# Patient Record
Sex: FEMALE | Race: WHITE | NOT HISPANIC OR LATINO | Employment: FULL TIME | URBAN - NONMETROPOLITAN AREA
[De-identification: names, ages, dates, MRNs, and addresses within clinical notes are randomized per-mention and may not be internally consistent; named-entity substitution may affect disease eponyms.]

---
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
## 2024-01-22 ENCOUNTER — OFFICE VISIT (OUTPATIENT)
Dept: URGENT CARE | Facility: PHYSICIAN GROUP | Age: 19
End: 2024-01-22
Payer: COMMERCIAL

## 2024-01-22 VITALS
BODY MASS INDEX: 35.77 KG/M2 | OXYGEN SATURATION: 99 % | HEIGHT: 68 IN | RESPIRATION RATE: 18 BRPM | HEART RATE: 97 BPM | SYSTOLIC BLOOD PRESSURE: 122 MMHG | WEIGHT: 236 LBS | DIASTOLIC BLOOD PRESSURE: 68 MMHG | TEMPERATURE: 96.8 F

## 2024-01-22 DIAGNOSIS — R05.1 ACUTE COUGH: ICD-10-CM

## 2024-01-22 PROCEDURE — 3078F DIAST BP <80 MM HG: CPT | Performed by: REGISTERED NURSE

## 2024-01-22 PROCEDURE — 99213 OFFICE O/P EST LOW 20 MIN: CPT | Performed by: REGISTERED NURSE

## 2024-01-22 PROCEDURE — 3074F SYST BP LT 130 MM HG: CPT | Performed by: REGISTERED NURSE

## 2024-01-22 RX ORDER — BENZONATATE 100 MG/1
100 CAPSULE ORAL 3 TIMES DAILY PRN
Qty: 30 CAPSULE | Refills: 0 | Status: SHIPPED | OUTPATIENT
Start: 2024-01-22 | End: 2024-02-01

## 2024-01-22 RX ORDER — DEXTROMETHORPHAN HYDROBROMIDE AND PROMETHAZINE HYDROCHLORIDE 15; 6.25 MG/5ML; MG/5ML
5 SYRUP ORAL EVERY 4 HOURS PRN
Qty: 120 ML | Refills: 0 | Status: SHIPPED | OUTPATIENT
Start: 2024-01-22

## 2024-01-22 ASSESSMENT — ENCOUNTER SYMPTOMS
FEVER: 0
COUGH: 1

## 2024-01-22 NOTE — PROGRESS NOTES
"Subjective:   Rogers Reynoso is a 18 y.o. female who presents for Congestion, Cough (Nasal and chest congestion sx 3 day ), and Body Aches (Exp to RSV and Flu )    HPI  Cold symptoms started 3-4 days ago. Fever on Saturday since resolved. Was exposed to RSV and Flu. No pertinent heart or lung issues. She is using OTC medications.     Review of Systems   Constitutional:  Negative for fever.   HENT:  Positive for congestion.    Respiratory:  Positive for cough.    Cardiovascular:  Negative for chest pain.       Medications, Allergies, and current problem list reviewed today in Epic.     Objective:     /68   Pulse 97   Temp 36 °C (96.8 °F) (Temporal)   Resp 18   Ht 1.727 m (5' 8\")   Wt 107 kg (236 lb)   SpO2 99%     Physical Exam  Vitals and nursing note reviewed.   Constitutional:       Appearance: Normal appearance. She is not ill-appearing or toxic-appearing.   HENT:      Right Ear: Tympanic membrane normal.      Left Ear: Tympanic membrane normal.      Nose: Congestion and rhinorrhea present.      Mouth/Throat:      Mouth: Mucous membranes are moist.      Comments: PND  Eyes:      Pupils: Pupils are equal, round, and reactive to light.   Cardiovascular:      Rate and Rhythm: Normal rate and regular rhythm.      Heart sounds: No murmur heard.  Pulmonary:      Effort: Pulmonary effort is normal. No respiratory distress.      Breath sounds: Normal breath sounds.   Musculoskeletal:         General: Normal range of motion.      Cervical back: Normal range of motion.   Skin:     General: Skin is warm and dry.      Capillary Refill: Capillary refill takes less than 2 seconds.      Findings: No rash.   Neurological:      General: No focal deficit present.      Mental Status: She is alert and oriented to person, place, and time.      Cranial Nerves: No cranial nerve deficit.   Psychiatric:         Mood and Affect: Mood normal.         Assessment/Plan:       1. Acute cough  benzonatate (TESSALON) 100 MG Cap "    promethazine-dextromethorphan (PROMETHAZINE-DM) 6.25-15 MG/5ML syrup        Cold symptoms after confirmed exposure to RSV and possibly flu.  Day 4 of symptoms outside treatment window so testing deferred.  No pertinent medical history.  Vital signs reassuring.  Benign exam.  Offered reassurance.  Recommend adequate hydration, rest, deep breathing and coughing, ambulation as tolerated, OTC medications.    Differential diagnosis, natural history, and supportive care discussed.     Advised the patient to follow-up with the primary care physician for recheck, reevaluation, and consideration of further management.    Indications for ED discussed at length. Red flag symptoms discussed. All side effects of medication discussed including allergic response, GI upset, tendon injury, rash, sedation etc. Patient and/or guardian voices understanding.     I personally reviewed prior external notes and test results pertinent to today's visit as well as additional imaging and testing completed in clinic today.     Please note that this dictation was created using voice recognition software. I have made every reasonable attempt to correct obvious errors, but I expect that there are errors of grammar and possibly content that I did not discover before finalizing the note.    This note was electronically signed by KENZIE Orona

## 2024-05-07 ENCOUNTER — OFFICE VISIT (OUTPATIENT)
Dept: URGENT CARE | Facility: PHYSICIAN GROUP | Age: 19
End: 2024-05-07
Payer: COMMERCIAL

## 2024-05-07 ENCOUNTER — APPOINTMENT (OUTPATIENT)
Dept: RADIOLOGY | Facility: IMAGING CENTER | Age: 19
End: 2024-05-07
Attending: NURSE PRACTITIONER
Payer: COMMERCIAL

## 2024-05-07 VITALS
BODY MASS INDEX: 35.7 KG/M2 | WEIGHT: 241 LBS | SYSTOLIC BLOOD PRESSURE: 124 MMHG | OXYGEN SATURATION: 95 % | RESPIRATION RATE: 18 BRPM | HEIGHT: 69 IN | HEART RATE: 106 BPM | TEMPERATURE: 97.8 F | DIASTOLIC BLOOD PRESSURE: 62 MMHG

## 2024-05-07 DIAGNOSIS — Z72.89 ENGAGES IN VAPING: ICD-10-CM

## 2024-05-07 DIAGNOSIS — R05.8 SPASMODIC COUGH: ICD-10-CM

## 2024-05-07 DIAGNOSIS — Z82.5 FAMILY HISTORY OF ASTHMA: ICD-10-CM

## 2024-05-07 PROCEDURE — 71046 X-RAY EXAM CHEST 2 VIEWS: CPT | Mod: TC,FY | Performed by: RADIOLOGY

## 2024-05-07 PROCEDURE — 99214 OFFICE O/P EST MOD 30 MIN: CPT | Performed by: NURSE PRACTITIONER

## 2024-05-07 PROCEDURE — 3074F SYST BP LT 130 MM HG: CPT | Performed by: NURSE PRACTITIONER

## 2024-05-07 PROCEDURE — 3078F DIAST BP <80 MM HG: CPT | Performed by: NURSE PRACTITIONER

## 2024-05-07 RX ORDER — ALBUTEROL SULFATE 90 UG/1
1-2 AEROSOL, METERED RESPIRATORY (INHALATION) EVERY 4 HOURS PRN
Qty: 8 G | Refills: 0 | Status: SHIPPED | OUTPATIENT
Start: 2024-05-07

## 2024-05-07 RX ORDER — PREDNISONE 20 MG/1
40 TABLET ORAL DAILY
Qty: 10 TABLET | Refills: 0 | Status: SHIPPED | OUTPATIENT
Start: 2024-05-07 | End: 2024-05-12

## 2024-05-07 RX ORDER — BENZONATATE 200 MG/1
200 CAPSULE ORAL EVERY 8 HOURS PRN
Qty: 30 CAPSULE | Refills: 0 | Status: SHIPPED | OUTPATIENT
Start: 2024-05-07

## 2024-05-07 ASSESSMENT — ENCOUNTER SYMPTOMS
VOMITING: 1
WHEEZING: 0
NAUSEA: 0
COUGH: 1
CONSTITUTIONAL NEGATIVE: 1
SPUTUM PRODUCTION: 0
HEARTBURN: 0
FEVER: 0
ABDOMINAL PAIN: 0
SHORTNESS OF BREATH: 1
CHILLS: 0

## 2024-05-07 ASSESSMENT — VISUAL ACUITY: OU: 1

## 2024-05-07 NOTE — PATIENT INSTRUCTIONS
Details    Reading Physician Reading Date Result Priority   Araceli Balbuena M.D.  [phone] 5/7/2024      Narrative & Impression     5/7/2024 3:32 PM     HISTORY/REASON FOR EXAM:  Cough and chest congestion.        TECHNIQUE/EXAM DESCRIPTION AND NUMBER OF VIEWS:  Two views of the chest.     COMPARISON:  8/9/2014     FINDINGS:        The mediastinal and cardiac silhouette is unremarkable.     The pulmonary vascularity is within normal limits.     The lung parenchyma is clear.     There is no significant pleural effusion.     There is no visible pneumothorax.     There are no acute bony abnormalities.     IMPRESSION:     1.  Unremarkable two view chest.           Exam Ended: 05/07/24  3:37 PM Last Resulted: 05/07/24  3:39 PM

## 2024-05-07 NOTE — PROGRESS NOTES
Subjective:     Rogers Reynoso is a 19 y.o. female who presents for Cough (Dry cough. Causes Vomiting, sx 2 wks. ) and Diarrhea       Cough  This is a new problem. Episode onset: 2 weeks. Associated symptoms include shortness of breath. Pertinent negatives include no chills, fever, heartburn or wheezing. There is no history of environmental allergies.   Diarrhea   Associated symptoms include coughing and vomiting. Pertinent negatives include no abdominal pain, chills or fever.     She concerned a spasmodic cough that would occur randomly.  Coughing fits would last for about a minute.  During that timeframe, she would experience trouble breathing and shortness of breath.  Will cause her to vomit.  Otherwise, denies any symptoms.  Denies allergies.    Has vaped since she was 13.    Has family history of asthma.    Review of Systems   Constitutional: Negative.  Negative for chills, fever and malaise/fatigue.   HENT: Negative.  Negative for congestion.    Respiratory:  Positive for cough and shortness of breath. Negative for sputum production and wheezing.    Gastrointestinal:  Positive for vomiting. Negative for abdominal pain, heartburn and nausea.   Endo/Heme/Allergies:  Negative for environmental allergies.   All other systems reviewed and are negative.    Refer to HPI for additional details.    During this visit, appropriate PPE was worn, and hand hygiene was performed.    PMH:  has a past medical history of ADD (attention deficit disorder with hyperactivity).    MEDS:   Current Outpatient Medications:     benzonatate (TESSALON) 200 MG capsule, Take 1 Capsule by mouth every 8 hours as needed for Cough., Disp: 30 Capsule, Rfl: 0    albuterol 108 (90 Base) MCG/ACT Aero Soln inhalation aerosol, Inhale 1-2 Puffs every four hours as needed (Cough/bronchospasm)., Disp: 8 g, Rfl: 0    predniSONE (DELTASONE) 20 MG Tab, Take 2 Tablets by mouth every day for 5 days., Disp: 10 Tablet, Rfl: 0     "amphetamine-dextroamphetamine XR (ADDERALL XR) 10 MG CAPSULE SR 24 HR, Take 10 mg by mouth every morning., Disp: , Rfl:     Collagen-Vitamin C-Biotin (COLLAGEN 1500/C) 500-50-0.8 MG Cap, as directed, Disp: , Rfl:     ibuprofen (MOTRIN) 800 MG Tab, Take 1 Tablet by mouth every 8 hours as needed for Moderate Pain or Inflammation., Disp: 30 Tablet, Rfl: 0    cyclobenzaprine (FLEXERIL) 5 mg tablet, Take 1-2 Tablets by mouth every 8 hours as needed for Muscle Spasms., Disp: 30 Tablet, Rfl: 0    ondansetron (ZOFRAN ODT) 4 MG TABLET DISPERSIBLE, 1 TAB, ALLOW TO DISINTEGRATE IN MOUTH, EVERY 6 HOURS ONLY IF NEEDED FOR NAUSEA OR VOMITING., Disp: 15 Tablet, Rfl: 0    ALLERGIES:   Allergies   Allergen Reactions    Augmentin     Cat Hair Extract      Other reaction(s): Unknown     SURGHX:   Past Surgical History:   Procedure Laterality Date    CLOSED REDUCTION  2/16/2015    Performed by Darian Pugh M.D. at [facility]     SOCHX:  reports that she has never smoked. She has never used smokeless tobacco. She reports that she does not drink alcohol and does not use drugs.    FH: Per HPI as applicable/pertinent.      Objective:     /62   Pulse (!) 106   Temp 36.6 °C (97.8 °F) (Temporal)   Resp 18   Ht 1.753 m (5' 9\")   Wt 109 kg (241 lb)   SpO2 95%   BMI 35.59 kg/m²     Physical Exam  Nursing note reviewed.   Constitutional:       General: She is not in acute distress.     Appearance: She is well-developed. She is not ill-appearing or toxic-appearing.   HENT:      Nose: Nose normal.      Mouth/Throat:      Mouth: Mucous membranes are moist.      Pharynx: Oropharynx is clear.   Eyes:      General: Vision grossly intact.      Extraocular Movements: Extraocular movements intact.      Conjunctiva/sclera: Conjunctivae normal.   Neck:      Trachea: Phonation normal.   Cardiovascular:      Rate and Rhythm: Regular rhythm. Tachycardia present.      Heart sounds: Normal heart sounds.   Pulmonary:      Effort: " Pulmonary effort is normal. No respiratory distress.      Breath sounds: Normal breath sounds. No decreased breath sounds.   Musculoskeletal:         General: No deformity. Normal range of motion.      Cervical back: Normal range of motion.   Skin:     General: Skin is warm and dry.      Coloration: Skin is not pale.   Neurological:      Mental Status: She is alert and oriented to person, place, and time.      Motor: No weakness.   Psychiatric:         Behavior: Behavior normal. Behavior is cooperative.     Chest x-ray:    Details    Reading Physician Reading Date Result Priority   Araceli Balbuena M.D.  [phone] 5/7/2024      Narrative & Impression     5/7/2024 3:32 PM     HISTORY/REASON FOR EXAM:  Cough and chest congestion.        TECHNIQUE/EXAM DESCRIPTION AND NUMBER OF VIEWS:  Two views of the chest.     COMPARISON:  8/9/2014     FINDINGS:    The mediastinal and cardiac silhouette is unremarkable.     The pulmonary vascularity is within normal limits.     The lung parenchyma is clear.     There is no significant pleural effusion.     There is no visible pneumothorax.     There are no acute bony abnormalities.     IMPRESSION:     1.  Unremarkable two view chest.           Exam Ended: 05/07/24  3:37 PM Last Resulted: 05/07/24  3:39 PM           Radiology report and images reviewed by myself. Concur with findings.      Assessment/Plan:     1. Spasmodic cough  - DX-CHEST-2 VIEWS; Future  - benzonatate (TESSALON) 200 MG capsule; Take 1 Capsule by mouth every 8 hours as needed for Cough.  Dispense: 30 Capsule; Refill: 0  - albuterol 108 (90 Base) MCG/ACT Aero Soln inhalation aerosol; Inhale 1-2 Puffs every four hours as needed (Cough/bronchospasm).  Dispense: 8 g; Refill: 0  - predniSONE (DELTASONE) 20 MG Tab; Take 2 Tablets by mouth every day for 5 days.  Dispense: 10 Tablet; Refill: 0  - Referral to Pulmonary and Sleep Medicine  - Referral to establish with PCP    2. Family history of asthma  - Referral to  Pulmonary and Sleep Medicine  - Referral to establish with PCP    3. Engages in vaping  - Referral to Pulmonary and Sleep Medicine  - Referral to establish with PCP    Vital signs stable, afebrile, no acute distress at this time. Chest x-ray clear. Discussed likely self-limiting viral etiology and expected course and duration of illness. Consider allergies and irritants. Patient has vaped since she was 13.    Rx as above sent electronically.     Follow up with pulmonology if symptoms persist. Referral placed.    Follow up with PCP for all other routine/preventive care. Referral placed.    Monitor. Warning signs reviewed. Return precautions advised.     Differential diagnosis, natural history, supportive care, over-the-counter symptom management per 's instructions, close monitoring, and indications for immediate follow-up discussed.     All questions answered. Patient agrees with the plan of care.    Discharge summary provided.    Billing note: moderate complexity and moderate risk. Established patient. [address]. Please refer to LOS tool for details.

## 2024-06-19 ENCOUNTER — TELEPHONE (OUTPATIENT)
Dept: HEALTH INFORMATION MANAGEMENT | Facility: OTHER | Age: 19
End: 2024-06-19
Payer: COMMERCIAL

## 2024-06-27 ENCOUNTER — OFFICE VISIT (OUTPATIENT)
Dept: URGENT CARE | Facility: PHYSICIAN GROUP | Age: 19
End: 2024-06-27
Payer: COMMERCIAL

## 2024-06-27 VITALS
OXYGEN SATURATION: 96 % | RESPIRATION RATE: 16 BRPM | BODY MASS INDEX: 36.43 KG/M2 | HEART RATE: 99 BPM | TEMPERATURE: 97.6 F | SYSTOLIC BLOOD PRESSURE: 118 MMHG | DIASTOLIC BLOOD PRESSURE: 74 MMHG | WEIGHT: 246 LBS | HEIGHT: 69 IN

## 2024-06-27 DIAGNOSIS — H66.002 NON-RECURRENT ACUTE SUPPURATIVE OTITIS MEDIA OF LEFT EAR WITHOUT SPONTANEOUS RUPTURE OF TYMPANIC MEMBRANE: Primary | ICD-10-CM

## 2024-06-27 DIAGNOSIS — J02.9 PHARYNGITIS, UNSPECIFIED ETIOLOGY: ICD-10-CM

## 2024-06-27 DIAGNOSIS — R05.1 ACUTE COUGH: ICD-10-CM

## 2024-06-27 LAB
FLUAV RNA SPEC QL NAA+PROBE: NEGATIVE
FLUBV RNA SPEC QL NAA+PROBE: NEGATIVE
RSV RNA SPEC QL NAA+PROBE: NEGATIVE
S PYO DNA SPEC NAA+PROBE: NOT DETECTED
SARS-COV-2 RNA RESP QL NAA+PROBE: NEGATIVE

## 2024-06-27 PROCEDURE — 99213 OFFICE O/P EST LOW 20 MIN: CPT | Performed by: NURSE PRACTITIONER

## 2024-06-27 PROCEDURE — 87651 STREP A DNA AMP PROBE: CPT | Performed by: NURSE PRACTITIONER

## 2024-06-27 PROCEDURE — 3074F SYST BP LT 130 MM HG: CPT | Performed by: NURSE PRACTITIONER

## 2024-06-27 PROCEDURE — 3078F DIAST BP <80 MM HG: CPT | Performed by: NURSE PRACTITIONER

## 2024-06-27 PROCEDURE — 0241U POCT CEPHEID COV-2, FLU A/B, RSV - PCR: CPT | Performed by: NURSE PRACTITIONER

## 2024-06-27 RX ORDER — CEFDINIR 300 MG/1
300 CAPSULE ORAL 2 TIMES DAILY
Qty: 20 CAPSULE | Refills: 0 | Status: SHIPPED | OUTPATIENT
Start: 2024-06-27 | End: 2024-07-07

## 2024-06-27 RX ORDER — METHYLPREDNISOLONE 4 MG/1
TABLET ORAL
Qty: 21 TABLET | Refills: 0 | Status: SHIPPED | OUTPATIENT
Start: 2024-06-27

## 2024-06-27 RX ORDER — DEXTROMETHORPHAN HYDROBROMIDE AND PROMETHAZINE HYDROCHLORIDE 15; 6.25 MG/5ML; MG/5ML
5 SYRUP ORAL EVERY 4 HOURS PRN
Qty: 120 ML | Refills: 0 | Status: SHIPPED | OUTPATIENT
Start: 2024-06-27 | End: 2024-07-04

## 2024-06-27 ASSESSMENT — ENCOUNTER SYMPTOMS
DIARRHEA: 0
NAUSEA: 0
SORE THROAT: 1
VOMITING: 0
FEVER: 1
RHINORRHEA: 1
ABDOMINAL PAIN: 0
COUGH: 1
HEADACHES: 1
SHORTNESS OF BREATH: 1
WHEEZING: 0
SPUTUM PRODUCTION: 0

## 2024-06-27 NOTE — PROGRESS NOTES
"Subjective:     Rogers Reynoso is a 19 y.o. female who presents for Cough (X 1 months.  Pt. Now has sore throat, body aches, chills, sinus congestion, wheezing, S.O.B. x 3 days. )      Cough  This is a new problem. The current episode started in the past 7 days (Rogers is a pleasant 19 year old female who presents to  today with complaints of URI symptoms X 4 days). The problem has been gradually worsening. The cough is Non-productive. Associated symptoms include a fever, headaches, rhinorrhea, a sore throat and shortness of breath. Pertinent negatives include no wheezing. Treatments tried: Dayquil, NSAIDS. There is no history of asthma.         Review of Systems   Constitutional:  Positive for fever and malaise/fatigue.   HENT:  Positive for congestion, rhinorrhea and sore throat.    Respiratory:  Positive for cough and shortness of breath. Negative for sputum production and wheezing.    Gastrointestinal:  Negative for abdominal pain, diarrhea, nausea and vomiting.        Vomiting with coughing episodes.    Neurological:  Positive for headaches.       PMH:   Past Medical History:   Diagnosis Date    ADD (attention deficit disorder with hyperactivity)      ALLERGIES:   Allergies   Allergen Reactions    Augmentin     Cat Hair Extract      Other reaction(s): Unknown     SURGHX:   Past Surgical History:   Procedure Laterality Date    CLOSED REDUCTION  2/16/2015    Performed by Darian Pugh M.D. at [facility]     SOCHX:   Social History     Socioeconomic History    Marital status: Single   Tobacco Use    Smoking status: Never    Smokeless tobacco: Never   Vaping Use    Vaping status: Never Used   Substance and Sexual Activity    Alcohol use: No    Drug use: No     FH: No family history on file.      Objective:   /74   Pulse 99   Temp 36.4 °C (97.6 °F) (Temporal)   Resp 16   Ht 1.753 m (5' 9\")   Wt 112 kg (246 lb)   SpO2 96%   BMI 36.33 kg/m²     Physical Exam  Vitals and nursing " note reviewed.   Constitutional:       General: She is not in acute distress.     Appearance: Normal appearance. She is normal weight. She is not ill-appearing or toxic-appearing.   HENT:      Head: Normocephalic.      Right Ear: Tympanic membrane, ear canal and external ear normal.      Left Ear: External ear normal. Tenderness present. There is impacted cerumen. Tympanic membrane is erythematous and bulging.      Ears:      Comments: Cerumen removed via lavage left ear by MA. She tolerated this well.      Nose: Congestion present. No rhinorrhea.      Mouth/Throat:      Pharynx: Uvula midline. Pharyngeal swelling and posterior oropharyngeal erythema present. No oropharyngeal exudate or uvula swelling.      Tonsils: Tonsillar exudate present. No tonsillar abscesses. 3+ on the right. 3+ on the left.   Eyes:      General:         Right eye: No discharge.         Left eye: No discharge.      Pupils: Pupils are equal, round, and reactive to light.   Cardiovascular:      Rate and Rhythm: Normal rate and regular rhythm.      Pulses: Normal pulses.      Heart sounds: Normal heart sounds.   Pulmonary:      Effort: Pulmonary effort is normal. No respiratory distress.      Breath sounds: No stridor. No wheezing, rhonchi or rales.   Chest:      Chest wall: No tenderness.   Abdominal:      General: Abdomen is flat.   Musculoskeletal:         General: Normal range of motion.      Cervical back: Normal range of motion and neck supple.   Skin:     General: Skin is dry.   Neurological:      General: No focal deficit present.      Mental Status: She is alert and oriented to person, place, and time. Mental status is at baseline.   Psychiatric:         Mood and Affect: Mood normal.         Behavior: Behavior normal.         Thought Content: Thought content normal.         Judgment: Judgment normal.     Results for orders placed or performed in visit on 06/27/24   POCT CEPHEID GROUP A STREP - PCR   Result Value Ref Range    POC Group A  Strep, PCR Not Detected Not Detected, Invalid   POCT CoV-2, Flu A/B, RSV by PCR   Result Value Ref Range    SARS-CoV-2 by PCR Negative Negative, Invalid    Influenza virus A RNA Negative Negative, Invalid    Influenza virus B, PCR Negative Negative, Invalid    RSV, PCR Negative Negative, Invalid       Assessment/Plan:   Assessment    1. Non-recurrent acute suppurative otitis media of left ear without spontaneous rupture of tympanic membrane  cefdinir (OMNICEF) 300 MG Cap      2. Pharyngitis, unspecified etiology  POCT CEPHEID GROUP A STREP - PCR    POCT CoV-2, Flu A/B, RSV by PCR    methylPREDNISolone (MEDROL DOSEPAK) 4 MG Tablet Therapy Pack    promethazine-dextromethorphan (PROMETHAZINE-DM) 6.25-15 MG/5ML syrup      3. Acute cough  promethazine-dextromethorphan (PROMETHAZINE-DM) 6.25-15 MG/5ML syrup        Viral testing negative in the clinic today.  Due to otitis media she was started on cefdinir for treatment.  I am also concerned for acute bronchitis due to severe coughing episodes and emesis following severe cough.  Medrol Dosepak sent to pharmacy for treatment in addition to promethazine/dextromethorphan.  Side effects discussed with patient.  We discussed supportive measures including humidifier, warm salt water gargles, over-the-counter Cepacol throat lozenges, rest  and increased fluids. Pt was encouraged to seek treatment back in the ER or urgent care for worsening symptoms,  fever greater than 100.5, wheezes or shortness of breath.    AVS handout given and reviewed with patient. Pt educated on red flags and when to seek treatment back in ER or UC.

## 2024-06-27 NOTE — LETTER
June 27, 2024    To Whom It May Concern:         This is confirmation that Rogers Reynoso attended her scheduled appointment with KENZIE Gonzalez on 6/27/24. Please excuse her absence due to an acute illness. She may return to work on 7/1/2024 or sooner if better.          If you have any questions please do not hesitate to call me at the phone number listed below.    Sincerely,          Kayleen Garcia A.P.R.N.  [phone]

## 2024-07-08 ENCOUNTER — TELEPHONE (OUTPATIENT)
Dept: HEALTH INFORMATION MANAGEMENT | Facility: OTHER | Age: 19
End: 2024-07-08
Payer: COMMERCIAL

## 2024-08-28 ENCOUNTER — OFFICE VISIT (OUTPATIENT)
Dept: MEDICAL GROUP | Facility: PHYSICIAN GROUP | Age: 19
End: 2024-08-28
Payer: COMMERCIAL

## 2024-08-28 DIAGNOSIS — Z72.0 VAPES NICOTINE CONTAINING SUBSTANCE: ICD-10-CM

## 2024-08-28 DIAGNOSIS — G43.909 MIGRAINE WITHOUT STATUS MIGRAINOSUS, NOT INTRACTABLE, UNSPECIFIED MIGRAINE TYPE: ICD-10-CM

## 2024-08-28 DIAGNOSIS — Z11.59 NEED FOR HEPATITIS C SCREENING TEST: ICD-10-CM

## 2024-08-28 DIAGNOSIS — E66.3 OVERWEIGHT: ICD-10-CM

## 2024-08-28 DIAGNOSIS — F90.9 ATTENTION DEFICIT HYPERACTIVITY DISORDER (ADHD), UNSPECIFIED ADHD TYPE: ICD-10-CM

## 2024-08-28 DIAGNOSIS — M79.602 PAIN OF LEFT UPPER EXTREMITY: ICD-10-CM

## 2024-08-28 DIAGNOSIS — G51.0 BELL'S PALSY: ICD-10-CM

## 2024-08-28 DIAGNOSIS — Z11.3 SCREENING EXAMINATION FOR SEXUALLY TRANSMITTED DISEASE: ICD-10-CM

## 2024-08-28 DIAGNOSIS — N94.6 DYSMENORRHEA: ICD-10-CM

## 2024-08-28 PROBLEM — K29.70 GASTRITIS: Status: RESOLVED | Noted: 2024-08-28 | Resolved: 2024-08-28

## 2024-08-28 PROBLEM — K29.70 GASTRITIS: Status: ACTIVE | Noted: 2024-08-28

## 2024-08-28 PROCEDURE — 99214 OFFICE O/P EST MOD 30 MIN: CPT | Performed by: PHYSICIAN ASSISTANT

## 2024-08-28 ASSESSMENT — ENCOUNTER SYMPTOMS
CHILLS: 0
FEVER: 0
SHORTNESS OF BREATH: 0

## 2024-08-28 NOTE — ASSESSMENT & PLAN NOTE
Chronic, uncontrolled.  Treated starting around age 6.  Has been treated with Vyvanse and Adderall.  Stopped due severe decreased appetite.  Therapy helped her deal with ADHD without medication.  Now just uses it as needed: when she has a lot to get done or with school.  Restarting school soon.  Has left over Adderall and Vyvanse.

## 2024-08-28 NOTE — ASSESSMENT & PLAN NOTE
Diagnosed 2021.  Continues to have intermittent vertigo which causes nausea.  Tolerate/continue ondansetron as needed.

## 2024-08-28 NOTE — PROGRESS NOTES
SUBJECTIVE:     CC: establish care; prior [facility]     HPI:   Rogers presents today with the following:    ASSESSMENT & PLAN by Problem:     Problem   Attention Deficit Hyperactivity Disorder (Adhd)    Chronic, intermittent.  Doesn't need any medication a this time.  Still has left over that she uses as needed.  Will make a follow up appointment when she needs a refill.     Bell's Palsy    Diagnosed 2021.  Continues to have intermittent vertigo which causes nausea.  Tolerate/continue ondansetron as needed.     Dysmenorrhea    Chronic, stable.  Significant improvement since Nexplanon placement around 2021.     Migraine Headache    Chronic, stable.  1-2x/month.  Manageable with OTC medications.     Overweight    Chronic, uncontrolled.  Increase plant-based foods, decrease animal-based foods.  Increase daily activity, aiming for 30-45 minutes brisk exercise daily.       Vapes Nicotine Containing Substance    Chronic, uncontrolled.  Not ready to quit.     Gastritis (Resolved)       Have labs drawn.    PAP: Due at 20; managed by GYN    PDMP review shows no signs of medication abuse or misuse:      Return if symptoms worsen or fail to improve.        Healthcare Maintenance:      HPI:     Problem List Items Addressed This Visit       Attention deficit hyperactivity disorder (ADHD)     Chronic, uncontrolled.  Treated starting around age 6.  Has been treated with Vyvanse and Adderall.  Stopped due severe decreased appetite.  Therapy helped her deal with ADHD without medication.  Now just uses it as needed: when she has a lot to get done or with school.  Restarting school soon.  Has left over Adderall and Vyvanse.         Bell's palsy     Diagnosed 2021.  Continues to have intermittent vertigo which causes nausea.  Tolerate/continue ondansetron as needed.         Dysmenorrhea     Chronic, stable.  Significant improvement since Nexplanon placement around 2021.         Migraine headache     Chronic,  stable.  1-2x/month.  Manageable with OTC medications.         Overweight     Chronic, uncontrolled.  Increase plant-based foods, decrease animal-based foods.  Increase daily activity, aiming for 30-45 minutes brisk exercise daily.           Relevant Orders    CBC WITH DIFFERENTIAL    Comp Metabolic Panel    Lipid Profile    TSH WITH REFLEX TO FT4    Vapes nicotine containing substance     Chronic, uncontrolled.  Not ready to quit.          Other Visit Diagnoses       Screening examination for sexually transmitted disease        Relevant Orders    Chlamydia/GC, PCR (Urine)    HIV AG/AB COMBO ASSAY SCREENING    Need for hepatitis C screening test        Relevant Orders    HEP C VIRUS ANTIBODY    Pain of left upper extremity        Relevant Orders    US-EXTREMITY VENOUS UPPER UNILAT LEFT                   ROS:  Review of Systems   Constitutional:  Negative for chills and fever.   Respiratory:  Negative for shortness of breath.    Cardiovascular:  Negative for chest pain.       OBJECTIVE:     Exam:  There were no vitals taken for this visit. There is no height or weight on file to calculate BMI.    Downtime procedures.  No access to EPIC at the time patient was evaluated.  Paper chart will be scanned into Media for any additional information not included in the EPIC chart note.      Physical Exam  Vitals reviewed.   Constitutional:       General: She is not in acute distress.     Appearance: Normal appearance.   Pulmonary:      Effort: Pulmonary effort is normal.   Neurological:      General: No focal deficit present.      Mental Status: She is alert.   Psychiatric:         Mood and Affect: Mood normal.         Behavior: Behavior normal.         Judgment: Judgment normal.                  Please note that this dictation was created using voice recognition software. I have made every reasonable attempt to correct obvious errors, but I expect that there are errors of grammar and possibly content that I did not discover  before finalizing the note.

## 2024-10-22 ENCOUNTER — OFFICE VISIT (OUTPATIENT)
Dept: URGENT CARE | Facility: PHYSICIAN GROUP | Age: 19
End: 2024-10-22
Payer: COMMERCIAL

## 2024-10-22 VITALS
DIASTOLIC BLOOD PRESSURE: 68 MMHG | HEIGHT: 69 IN | OXYGEN SATURATION: 98 % | TEMPERATURE: 97.3 F | WEIGHT: 245.6 LBS | HEART RATE: 70 BPM | RESPIRATION RATE: 16 BRPM | BODY MASS INDEX: 36.38 KG/M2 | SYSTOLIC BLOOD PRESSURE: 126 MMHG

## 2024-10-22 DIAGNOSIS — L02.412 ABSCESS OF LEFT AXILLA: ICD-10-CM

## 2024-10-22 PROCEDURE — 10061 I&D ABSCESS COMP/MULTIPLE: CPT | Mod: LT | Performed by: NURSE PRACTITIONER

## 2024-10-22 PROCEDURE — 3078F DIAST BP <80 MM HG: CPT | Performed by: NURSE PRACTITIONER

## 2024-10-22 PROCEDURE — 3074F SYST BP LT 130 MM HG: CPT | Performed by: NURSE PRACTITIONER

## 2024-10-22 RX ORDER — IBUPROFEN 200 MG
600 TABLET ORAL ONCE
Status: COMPLETED | OUTPATIENT
Start: 2024-10-22 | End: 2024-10-23

## 2024-10-22 RX ORDER — DOXYCYCLINE HYCLATE 100 MG
100 TABLET ORAL 2 TIMES DAILY
Qty: 14 TABLET | Refills: 0 | Status: SHIPPED | OUTPATIENT
Start: 2024-10-22 | End: 2024-10-29

## 2024-10-23 RX ADMIN — Medication 600 MG: at 09:31

## 2024-10-24 ENCOUNTER — OFFICE VISIT (OUTPATIENT)
Dept: URGENT CARE | Facility: PHYSICIAN GROUP | Age: 19
End: 2024-10-24
Payer: COMMERCIAL

## 2024-10-24 VITALS
HEIGHT: 68 IN | TEMPERATURE: 96.9 F | WEIGHT: 243 LBS | SYSTOLIC BLOOD PRESSURE: 112 MMHG | BODY MASS INDEX: 36.83 KG/M2 | DIASTOLIC BLOOD PRESSURE: 62 MMHG | HEART RATE: 61 BPM | RESPIRATION RATE: 18 BRPM | OXYGEN SATURATION: 97 %

## 2024-10-24 DIAGNOSIS — Z51.89 WOUND CHECK, ABSCESS: ICD-10-CM

## 2024-10-24 PROCEDURE — 3074F SYST BP LT 130 MM HG: CPT | Performed by: NURSE PRACTITIONER

## 2024-10-24 PROCEDURE — 3078F DIAST BP <80 MM HG: CPT | Performed by: NURSE PRACTITIONER

## 2024-10-24 PROCEDURE — 99212 OFFICE O/P EST SF 10 MIN: CPT | Performed by: NURSE PRACTITIONER

## 2024-10-26 ENCOUNTER — OFFICE VISIT (OUTPATIENT)
Dept: URGENT CARE | Facility: CLINIC | Age: 19
End: 2024-10-26
Payer: COMMERCIAL

## 2024-10-26 VITALS
TEMPERATURE: 97.6 F | OXYGEN SATURATION: 95 % | HEIGHT: 68 IN | RESPIRATION RATE: 16 BRPM | SYSTOLIC BLOOD PRESSURE: 122 MMHG | HEART RATE: 83 BPM | BODY MASS INDEX: 36.83 KG/M2 | DIASTOLIC BLOOD PRESSURE: 67 MMHG | WEIGHT: 243 LBS

## 2024-10-26 DIAGNOSIS — Z51.89 WOUND CHECK, ABSCESS: ICD-10-CM

## 2024-10-26 PROCEDURE — 99212 OFFICE O/P EST SF 10 MIN: CPT | Performed by: NURSE PRACTITIONER

## 2024-10-26 ASSESSMENT — ENCOUNTER SYMPTOMS
MUSCULOSKELETAL NEGATIVE: 1
CHILLS: 0
FEVER: 0
NEUROLOGICAL NEGATIVE: 1